# Patient Record
Sex: FEMALE
[De-identification: names, ages, dates, MRNs, and addresses within clinical notes are randomized per-mention and may not be internally consistent; named-entity substitution may affect disease eponyms.]

---

## 2018-09-09 ENCOUNTER — HOSPITAL (OUTPATIENT)
Dept: OTHER | Age: 33
End: 2018-09-09
Attending: EMERGENCY MEDICINE

## 2022-06-15 ENCOUNTER — OFFICE VISIT (OUTPATIENT)
Dept: RHEUMATOLOGY | Facility: CLINIC | Age: 37
End: 2022-06-15
Payer: COMMERCIAL

## 2022-06-15 ENCOUNTER — LAB ENCOUNTER (OUTPATIENT)
Dept: LAB | Facility: HOSPITAL | Age: 37
End: 2022-06-15
Attending: INTERNAL MEDICINE
Payer: COMMERCIAL

## 2022-06-15 VITALS
BODY MASS INDEX: 31.65 KG/M2 | SYSTOLIC BLOOD PRESSURE: 118 MMHG | HEART RATE: 98 BPM | HEIGHT: 65 IN | DIASTOLIC BLOOD PRESSURE: 87 MMHG | WEIGHT: 190 LBS

## 2022-06-15 DIAGNOSIS — M79.7 FIBROMYALGIA: ICD-10-CM

## 2022-06-15 DIAGNOSIS — R76.8 POSITIVE ANA (ANTINUCLEAR ANTIBODY): ICD-10-CM

## 2022-06-15 DIAGNOSIS — R76.8 POSITIVE ANA (ANTINUCLEAR ANTIBODY): Primary | ICD-10-CM

## 2022-06-15 LAB — RHEUMATOID FACT SERPL-ACNC: <10 IU/ML (ref ?–15)

## 2022-06-15 PROCEDURE — 86235 NUCLEAR ANTIGEN ANTIBODY: CPT

## 2022-06-15 PROCEDURE — 99204 OFFICE O/P NEW MOD 45 MIN: CPT | Performed by: INTERNAL MEDICINE

## 2022-06-15 PROCEDURE — 86039 ANTINUCLEAR ANTIBODIES (ANA): CPT

## 2022-06-15 PROCEDURE — 86200 CCP ANTIBODY: CPT | Performed by: INTERNAL MEDICINE

## 2022-06-15 PROCEDURE — 86225 DNA ANTIBODY NATIVE: CPT

## 2022-06-15 PROCEDURE — 3008F BODY MASS INDEX DOCD: CPT | Performed by: INTERNAL MEDICINE

## 2022-06-15 PROCEDURE — 36415 COLL VENOUS BLD VENIPUNCTURE: CPT

## 2022-06-15 PROCEDURE — 3079F DIAST BP 80-89 MM HG: CPT | Performed by: INTERNAL MEDICINE

## 2022-06-15 PROCEDURE — 3074F SYST BP LT 130 MM HG: CPT | Performed by: INTERNAL MEDICINE

## 2022-06-15 PROCEDURE — 86431 RHEUMATOID FACTOR QUANT: CPT | Performed by: INTERNAL MEDICINE

## 2022-06-15 PROCEDURE — 86038 ANTINUCLEAR ANTIBODIES: CPT

## 2022-06-15 RX ORDER — TRAZODONE HYDROCHLORIDE 100 MG/1
TABLET ORAL
COMMUNITY

## 2022-06-15 RX ORDER — VENLAFAXINE HYDROCHLORIDE 150 MG/1
CAPSULE, EXTENDED RELEASE ORAL
COMMUNITY

## 2022-06-15 RX ORDER — MELATONIN: COMMUNITY

## 2022-06-15 RX ORDER — LEVONORGESTREL AND ETHINYL ESTRADIOL 0.15-0.03
KIT ORAL
COMMUNITY

## 2022-06-15 NOTE — PROGRESS NOTES
Aminata Cordova is a 40year old female who presents for Patient presents with:  Consult  . HPI:   CC: fibromyalgia  Consult: self-referred     This is a 41 yo F with hx of Migraine's (seeing neurologist), Anxiety/Depression, Double jaw reconstruction at age 12 presents for the evaluation of fibromyalgia and a positive DEANNE. She states that at the age of 15 she had braces and then had significant jaw pain where she could not open her mouth. She was diagnosed with TMJ but then developed headaches that were persistent. They were contributing the TMJ and the headaches together. 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 She went to [facility] to evaluate for headaches and they diagnosed her with chronic migraines. She has tried multiple medications and was seen by pain management. When she was younger nothing really helped her headaches. Now her migraines are not as severe. She will get them monthly for about 1 day. She will take Nurtec when she has migraines. It helps. She has a lot of pain in her neck and lower back. She has a lot of muscle pain around her neck and shoulders. Denies any joint pain or swelling. Blood work 2 years ago showed a positive DEANNE 1: 320 nuclear pattern. Showed normal CBC and CMP  Denies any recurrent rash, psoriasis, oral ulcers, sicca symptoms, hair loss, lymphadenopathy, serositis, DVT or PE, miscarriages, RP, chest pain or shortness of breath. Her aunt has fibromyalgia. No autoimmune disease in the family  She has tried multiple medications for her fibromyalgia in the past, Cymbalta, Lyrica, gabapentin and amitriptyline. They all caused side effects. Currently her muscle pain is not severe  Her big issue is fatigue. 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 She takes Adderall as needed also for her fatigue. It does help some of it.     Blood work:  +DEANNE 1:320 nuclear, normal CBC, CMP    Wt Readings from Last 2 Encounters:  No data found for Wt    There is no height or weight on file to calculate BMI. No current outpatient medications on file. No past medical history on file. No past surgical history on file. No family history on file. Social History:  Social History    Tobacco Use      Smoking status: Not on file      Smokeless tobacco: Not on file    Alcohol use: Not on file    Drug use: Not on file          REVIEW OF SYSTEMS:   Review Of Systems:  Constitutional: No fever, no change in weight or appetitie  Derm: No rashes, no oral ulcers, no alopecia, no photosensitivity, no psoriasis  HEENT: No dry eyes, no dry mouth, no Raynaud's, no nasal ulcers, no parotid swelling, no neck pain, no jaw pain, no temple pain  Eyes: No visual changes,   CVS: No chest pain, no heart disease  RS: No SOB, no Cough, No Pleurtic pain,   GI: No nausea, no vomiiting, no abominal pain, no hx of ulcer, no gastritis, no heartburn, no dyshpagia, no BRBPR or melena  : no dysuria, no hx of miscarriages, no DVT Hx, no hx of OCP,   Neuro: No numbness or tingling, no headache, no hx of seizures,   Psych: no hx of anxiety or depression  ENDO: no hx of thyroid disease, no hx of DM  Joint/Muscluskeltal: see HPI,   All other ROS are negative. EXAM:   There were no vitals taken for this visit. GEN: AAOx3, NAD  HEENT: EOMI, PERRLA, no injection or icterus, oral mucosa moist, no oral lesions. No lymphadenopathy. No facial rash  CVS: RRR, no murmurs rubs or gallops. Equal 2+ distal pulses. LUNGS: CTAB, no increased work of breathing  ABDOMEN:  soft NT/ND, +BS, no HSM  SKIN: No rashes or skin lesions.  No nail findings  MSK:  Cervical spine: FROM  Hands: no synovitis in DIP, PIP and MCP, strong full fists  Wrist: FROM, no pain or swelling or warmth on palpation  Elbow: FROM, no pain or swelling or warmth on palpation  Shoulders: FROM, no pain or swelling or warmth on palpation  Hip: normal log roll, no lateral hip pain, NORY test negative b/l  Knees: FROM, no warmth or effusion present. No pain with ROM. Ankles: FROM, no pain or swelling or warmth on palpation  Feet: no pain with MTP squeeze, no toe swelling or pain or warmth on palpation with FROM  Spine: no lumbar or sacral pain on palpation. NEURO: Cranial nerves II-XII intact grossly. 5/5 strength throughout in both upper and lower extremities, sensation intact. PSYCH: normal mood    LABS:     scanned    IMAGING:     None    ASSESSMENT AND PLAN:     +DEANNE  History of fibromyalgia  Chronic fatigue  Migraines    - Currently her fibromyalgia seems to be controlled. She has minimal pain. On exam she is not tender to palpation.  - She was found to have a positive DEANNE but her features are not consistent with a connective tissue disease. Will order ENRIKE panel but suspicion is low  - She is following with a neurologist for her migraines    Thank you for allowing me to participate in this patients care.      Mary Chris MD  6/15/2022  12:15 PM

## 2022-06-15 NOTE — PATIENT INSTRUCTIONS
You were seen today for history of fibromyalgia  Also history of chronic headaches  You were found to have a positive DEANNE, your symptoms do not appear to be consistent with anything autoimmune but we are can get some more blood work

## 2022-06-16 LAB — NUCLEAR IGG TITR SER IF: POSITIVE {TITER}

## 2022-06-17 LAB
ANA NUCLEOLAR TITR SER IF: 640 {TITER}
CCP IGG SERPL-ACNC: 1.2 U/ML (ref 0–6.9)
DSDNA AB TITR SER: <10 {TITER}

## 2022-06-18 LAB
ENA SM IGG SER QL: NEGATIVE
ENA SM+RNP AB SER QL: NEGATIVE
ENA SS-A AB SER QL IA: NEGATIVE
ENA SS-B AB SER QL IA: NEGATIVE

## 2022-09-11 ENCOUNTER — EMPLOYEE HEALTH (OUTPATIENT)
Dept: OTHER | Age: 37
End: 2022-09-11

## 2022-09-12 ENCOUNTER — EMPLOYEE HEALTH (OUTPATIENT)
Dept: OTHER | Age: 37
End: 2022-09-12

## 2022-09-17 ENCOUNTER — EMPLOYEE HEALTH (OUTPATIENT)
Dept: OTHER | Age: 37
End: 2022-09-17

## 2024-03-08 ENCOUNTER — OFFICE VISIT (OUTPATIENT)
Dept: FAMILY MEDICINE CLINIC | Facility: CLINIC | Age: 39
End: 2024-03-08
Payer: COMMERCIAL

## 2024-03-08 VITALS
RESPIRATION RATE: 18 BRPM | DIASTOLIC BLOOD PRESSURE: 78 MMHG | HEIGHT: 66 IN | TEMPERATURE: 98 F | WEIGHT: 220 LBS | HEART RATE: 88 BPM | BODY MASS INDEX: 35.36 KG/M2 | OXYGEN SATURATION: 99 % | SYSTOLIC BLOOD PRESSURE: 128 MMHG

## 2024-03-08 DIAGNOSIS — Z02.1 PHYSICAL EXAM, PRE-EMPLOYMENT: Primary | ICD-10-CM

## 2024-03-08 DIAGNOSIS — Z11.1 PPD SCREENING TEST: ICD-10-CM

## 2024-03-08 PROCEDURE — 3008F BODY MASS INDEX DOCD: CPT | Performed by: NURSE PRACTITIONER

## 2024-03-08 PROCEDURE — 3074F SYST BP LT 130 MM HG: CPT | Performed by: NURSE PRACTITIONER

## 2024-03-08 PROCEDURE — 99385 PREV VISIT NEW AGE 18-39: CPT | Performed by: NURSE PRACTITIONER

## 2024-03-08 PROCEDURE — 3078F DIAST BP <80 MM HG: CPT | Performed by: NURSE PRACTITIONER

## 2024-03-08 RX ORDER — DEXTROAMPHETAMINE SACCHARATE, AMPHETAMINE ASPARTATE MONOHYDRATE, DEXTROAMPHETAMINE SULFATE AND AMPHETAMINE SULFATE 7.5; 7.5; 7.5; 7.5 MG/1; MG/1; MG/1; MG/1
60 CAPSULE, EXTENDED RELEASE ORAL EVERY MORNING
COMMUNITY

## 2024-03-08 RX ORDER — NADOLOL 20 MG/1
20 TABLET ORAL DAILY
COMMUNITY

## 2024-03-08 RX ORDER — DULOXETIN HYDROCHLORIDE 30 MG/1
30 CAPSULE, DELAYED RELEASE ORAL DAILY
COMMUNITY

## 2024-03-08 NOTE — PROGRESS NOTES
CHIEF COMPLAINT:     Chief Complaint   Patient presents with    Physical     Basic pre-employment physical and need TB skin test - Entered by patient         HPI:   Destiney Cervantes is a 38 year old female who presents for a complete physical exam.     Patient concerns: none.  Activity tolerance: normal      Current Outpatient Medications   Medication Sig Dispense Refill    DULoxetine 30 MG Oral Cap DR Particles Take 1 capsule (30 mg total) by mouth daily.      sertraline (ZOLOFT) 50 MG Oral Tab Take 1.5 tablets (75 mg total) by mouth daily.      nadolol 20 MG Oral Tab Take 1 tablet (20 mg total) by mouth daily.      amphetamine-dextroamphetamine ER 30 MG Oral Capsule SR 24 Hr Take 2 capsules (60 mg total) by mouth every morning.      semaglutide-weight management 1.7 MG/0.75ML Subcutaneous Solution Auto-injector Inject 0.75 mL (1.7 mg total) into the skin once a week.      traZODone 100 MG Oral Tab       Levonorgest-Eth Estrad 91-Day 0.15-0.03 MG Oral Tab       Famotidine (PEPCID AC MAXIMUM STRENGTH OR)       cyanocobalamine 1000 MCG Oral Tab         History reviewed. No pertinent past medical history.   History reviewed. No pertinent surgical history.   History reviewed. No pertinent family history.   Social History:  Social History     Socioeconomic History    Marital status: Single      Occ: RN/DNP. : single. Children: none.   Exercise: none.  Diet: watches calories closely     REVIEW OF SYSTEMS:   GENERAL: Feels well otherwise  SKIN: denies any unusual skin lesions  EYES:denies blurred vision or double vision  HEENT: denies nasal congestion, sinus pain or ST  LUNGS: denies shortness of breath at rest on on exertion  CARDIOVASCULAR: denies chest pain or palpitations   GI: denies abdominal pain or heartburn.  No N/V/C/D.  : denies vaginal discharge, dysuria, suprapubic pain.   MUSCULOSKELETAL: denies back pain or other joint pain  NEURO: denies headaches  PSYCHE: denies depression or anxiety  HEMATOLOGIC:  denies hx of anemia or other bleeding disorders  ENDOCRINE: denies thyroid history  ALLERGY/ASTHMA: denies hx of seasonal allergies or asthma    EXAM:   /78 (BP Location: Left arm, Patient Position: Sitting, Cuff Size: adult)   Pulse 88   Temp 98 °F (36.7 °C) (Temporal)   Resp 18   Ht 5' 6\" (1.676 m)   Wt 220 lb (99.8 kg)   LMP  (LMP Unknown)   SpO2 99%   BMI 35.51 kg/m²   Body mass index is 35.51 kg/m².     GENERAL: well developed, well nourished,in no apparent distress  SKIN: no rashes,no suspicious lesions  HEENT: atraumatic, normocephalic,ears and throat are clear  EYES:PERRLA, EOMI, conjunctiva are clear  NECK: supple,no adenopathy,no bruits  CHEST: no chest tenderness  LUNGS: Clear to auscultation bilaterally. No diminished breath sounds. No wheezing, rhonchi, or rales.  CARDIO: RRR without murmur  GI: BS's present x4., No tenderness of palpation.  No obvious masses or palpable organomegaly   MUSCULOSKELETAL: back is not tender, ROM of the back fully intact  EXTREMITIES: no cyanosis, clubbing or edema  NEURO: Alert & Oriented x3. Cranial nerves are grossly intact.     ASSESSMENT AND PLAN:   Destiney Cervantes is a 38 year old female who presents for a pre employment physical exam. Patient's current BMI is Body mass index is 35.51 kg/m²..      ASSESSMENT:   1. Physical exam, pre-employment    2. PPD screening test      PLAN:     1. Physical exam, pre-employment  Encouraged patient to have full physical with health maintenance labs done by PCP within this year.  Nutrition counseling provided.     Form filled out and given to patient.  Form was copied and sent to scanning.     Patient's questions/concerns were addressed and answered. Patient is in agreement with treatment plan.     Patient is to follow up as needed with PCP or here in clinic.         2. PPD screening test  Return in 48-72 hours to get it read   - TB test, PPD/Tubersol/Aplisol (49413) (Dx/Z11.1)         Patient Instructions   You will  need to return to clinic in 48-72 hours to have results of TB test read.     Please return to clinic on 3/11/2024 before 5:15 pm to have your TB test read.    If you do not return during this time your test will need to be repeated.     Our clinic hours are:  Monday-Friday        8:00 am to 7:30 pm  Saturday/Sunday    9:00 am to 4:30 pm

## 2024-03-08 NOTE — PATIENT INSTRUCTIONS
You will need to return to clinic in 48-72 hours to have results of TB test read.     Please return to clinic on 3/11/2024 before 5:15 pm to have your TB test read.    If you do not return during this time your test will need to be repeated.     Our clinic hours are:  Monday-Friday        8:00 am to 7:30 pm  Saturday/Sunday    9:00 am to 4:30 pm

## 2024-03-11 ENCOUNTER — OFFICE VISIT (OUTPATIENT)
Dept: FAMILY MEDICINE CLINIC | Facility: CLINIC | Age: 39
End: 2024-03-11
Payer: COMMERCIAL

## 2024-03-11 DIAGNOSIS — Z11.1 ENCOUNTER FOR PPD SKIN TEST READING: Primary | ICD-10-CM

## 2024-03-11 LAB — INDURATION (): 0 MM (ref 0–11)

## 2024-03-11 NOTE — PROGRESS NOTES
Here for TB reading:    Results for orders placed or performed in visit on 03/08/24   TB test, PPD/Tubersol/Aplisol ([id number]) (Dx/Z11.1)    Collection Time: 03/11/24  3:29 PM   Result Value Ref Range    Date Given: 3/8/2024     Site: Right Forearm     INDURATION (PPD) 0.0 0.0 - 11 mm     Negative reading.

## (undated) NOTE — LETTER
Date: 3/11/2024    Patient Name: Destiney Cervantes          To Whom it may concern:    This letter has been written at the patient's request.       Results for orders placed or performed in visit on 03/08/24   TB test, PPD/Tubersol/Aplisol ([id number]) (Dx/Z11.1)    Collection Time: 03/11/24  3:29 PM   Result Value Ref Range    Date Given: 3/8/2024     Site: Right Forearm     INDURATION (PPD) 0.0 0.0 - 11 mm     TB results are negative.       Sincerely,         RAÚL Ochoa

## (undated) NOTE — LETTER
March 11, 2024    Destiney Cervantes  [address] [place] [address]      Dear Destiney:    The following are the results of your recent tests. Please review the list of test results.  Your result is the value on the left; we have also supplied the range of normal (low and high) values.    Results for orders placed or performed in visit on 06/15/22   Anti-Dsdna Antibodies   Result Value Ref Range    Anti Double Strand DNA <10 <10   Patel/RNP Antibodies   Result Value Ref Range    Anti-Patel/RNP Antibody Negative Negative   Smith Antibodies   Result Value Ref Range    Anti-Smith Antibody Negative Negative   Sjogren'S Anti-SS-A   Result Value Ref Range    Anti-Sjogren's A Negative Negative   Sjogren's Anti SSB   Result Value Ref Range    Anti-Sjogren's B Negative Negative   DEANNE, Direct, Reflex Titer + Spec AB   Result Value Ref Range    DEANNE Screen With Reflex Positive (A) Negative   DEANNE Titer/Pattern   Result Value Ref Range    DEANNE Titer/Pattern 640 (A) <80    Reviewed By: Michelle Fox M.D.          Please call if you have further questions,    ***